# Patient Record
Sex: FEMALE | Employment: UNEMPLOYED | URBAN - METROPOLITAN AREA
[De-identification: names, ages, dates, MRNs, and addresses within clinical notes are randomized per-mention and may not be internally consistent; named-entity substitution may affect disease eponyms.]

---

## 2024-03-09 ENCOUNTER — HOSPITAL ENCOUNTER (EMERGENCY)
Facility: HOSPITAL | Age: 19
Discharge: HOME | End: 2024-03-09
Payer: MEDICAID

## 2024-03-09 VITALS
RESPIRATION RATE: 16 BRPM | OXYGEN SATURATION: 99 % | SYSTOLIC BLOOD PRESSURE: 126 MMHG | HEART RATE: 88 BPM | WEIGHT: 175.04 LBS | HEIGHT: 65 IN | TEMPERATURE: 97.5 F | DIASTOLIC BLOOD PRESSURE: 70 MMHG | BODY MASS INDEX: 29.16 KG/M2

## 2024-03-09 DIAGNOSIS — B07.9 WART OF HAND: Primary | ICD-10-CM

## 2024-03-09 DIAGNOSIS — L03.011 CELLULITIS OF RIGHT MIDDLE FINGER: ICD-10-CM

## 2024-03-09 PROCEDURE — 99283 EMERGENCY DEPT VISIT LOW MDM: CPT

## 2024-03-09 ASSESSMENT — COLUMBIA-SUICIDE SEVERITY RATING SCALE - C-SSRS
1. IN THE PAST MONTH, HAVE YOU WISHED YOU WERE DEAD OR WISHED YOU COULD GO TO SLEEP AND NOT WAKE UP?: NO
6. HAVE YOU EVER DONE ANYTHING, STARTED TO DO ANYTHING, OR PREPARED TO DO ANYTHING TO END YOUR LIFE?: NO
2. HAVE YOU ACTUALLY HAD ANY THOUGHTS OF KILLING YOURSELF?: NO

## 2024-03-09 ASSESSMENT — PAIN SCALES - GENERAL: PAINLEVEL_OUTOF10: 0 - NO PAIN

## 2024-03-09 ASSESSMENT — PAIN - FUNCTIONAL ASSESSMENT: PAIN_FUNCTIONAL_ASSESSMENT: 0-10

## 2024-03-09 NOTE — ED PROVIDER NOTES
Department of Emergency Medicine   ED  Provider Note  Admit Date/RoomTime: No admission date for patient encounter.  ED Room: Room/bed info not found        History of Present Illness:  Chief Complaint   Patient presents with    Hand Pain     Finger growth x 1 month of finger after pinching it          Jeremie Alejandre is a 18 y.o. female no chronic medical illnesses [place]-speaking family present at bedside translate for patient per her request presenting to the ED for painful growth to the right middle finger, has been present for several months.  Has noticed some pinching sensation around the area.  No difficulty moving the finger.  No change in temperature color or sensation.  No fever or chills.  No injury.    Review of Systems:   Pertinent positives and negatives are stated within HPI, all other systems reviewed and are negative.        --------------------------------------------- PAST HISTORY ---------------------------------------------  Past Medical History:  has no past medical history on file.  Past Surgical History:  has no past surgical history on file.  Social History:    Family History: family history is not on file.. Unless otherwise noted, family history is non contributory  The patient’s home medications have been reviewed.  Allergies: Patient has no known allergies.        ---------------------------------------------------PHYSICAL EXAM--------------------------------------    GENERAL APPEARANCE: Awake and alert.   VITAL SIGNS: As per the nurses' triage record.   HEENT: Normocephalic, atraumatic. Extraocular muscles are intact.  Conjunctiva are pink. Negative scleral icterus. Mucous membranes are moist. Tongue in the midline. Pharynx was without erythema or exudates, uvula midline  CHEST: Nontender to palpation. Clear to auscultation bilaterally. No rales, rhonchi, or wheezing.   HEART: S1, S2. Regular rate and rhythm. No murmurs, gallops or rubs.  Strong and equal pulses in the extremities.  "  ABDOMEN: Soft, nontender, nondistended, positive bowel sounds, no palpable masses.  MUSCULCSKELETAL: Moving all extremities with no difficulty.  Right hand: Reveals fingers with difficulty cap refill less than 2 sensation intact.  Palmar aspect distal to the DIP joint over the fat pad of the middle finger patient has a wart with some surrounding erythema.  Tender to touch.  No lipogenic streaking purulent drainage or odor no signs of a septic joint.  Radial pulse strong and neurovascularly intact  NEUROLOGICAL: Awake, alert and oriented x 3. Power intact in the upper and lower extremities. Sensation is intact to light touch in the upper and lower extremities.   IMMUNOLOGICAL: No lymphatic streaking noted   DERM: No petechiae,  or ecchymoses.          ------------------------- NURSING NOTES AND VITALS REVIEWED ---------------------------  The nursing notes within the ED encounter and vital signs as below have been reviewed by myself  /70 (BP Location: Left arm, Patient Position: Sitting)   Pulse 88   Temp 36.4 °C (97.5 °F) (Oral)   Resp 16   Ht 1.651 m (5' 5\")   Wt 79.4 kg (175 lb 0.7 oz)   SpO2 99%   BMI 29.13 kg/m²     Oxygen Saturation Interpretation: 99% room air      The patient’s available past medical records and past encounters were reviewed.          -----------------------DIAGNOSTIC RESULTS------------------------  LABS:    Labs Reviewed - No data to display    As interpreted by me, the above displayed labs are abnormal. All other labs obtained during this visit were within normal range or not returned as of this dictation.        No orders to display           No orders to display           ------------------------------ ED COURSE/MEDICAL DECISION MAKING----------------------  Medical Decision Making:   Exam: A medically appropriate exam performed, outlined above, given the known history and presentation.    History obtained from: Review of medical record nursing notes patient patient family " who is translating      Social Determinants of Health considered during this visit: Lives at home with family      PAST MEDICAL HISTORY/Chronic Conditions Affecting Care     has no past medical history on file.       CC/HPI Summary, Social Determinants of health, Records Reviewed, DDx, testing done/not done, ED Course, Reassessment, disposition considerations/shared decision making with patient, consults, disposition:   Presents with pain to her right middle finger dominant hand with growth that has been present for couple of months  Patient has a wart palmar aspect right middle finger distal to the DIP joint.  No abscess to drain.  Slight erythema surrounding the wart concerning for start of infection.  Placed on antibiotic therapy.  Advised to use Compound W as directed follow-up with primary care taken about until completed monitor for signs and symptoms of worsening infection.  No sign of a septic joint.  No bony abnormality noted.  Advised Tylenol Motrin for pain first dose of antibiotic given in the emergency department    Medical Decision Making/Differential Diagnosis:  Differentials include not limited to cellulitis versus foreign body versus wart versus mass or growth.  Symptoms are consistent with wart concerning for early infection cellulitic  Patient seen independently attending physician available for consultation if needed  PROCEDURES  Unless otherwise noted below, none      CONSULTS:   None      Diagnoses as of 03/09/24 2219   Wart of hand   Cellulitis of right middle finger         This patient has remained hemodynamically stable during their ED course.      Critical Care: None      Counseling:  The emergency provider has spoken with the patient and family and discussed today’s results, in addition to providing specific details for the plan of care and counseling regarding the diagnosis and prognosis.  Questions are answered at this time and they are agreeable with the plan.          --------------------------------- IMPRESSION AND DISPOSITION ---------------------------------    IMPRESSION  1. Wart of hand    2. Cellulitis of right middle finger        DISPOSITION  Disposition: Discharge home  Patient condition is stable        NOTE: This report was transcribed using voice recognition software. Every effort was made to ensure accuracy; however, inadvertent computerized transcription errors may be present      Ivonne Polanco, [email or web address]  03/09/24 [id number]

## 2024-03-10 NOTE — DISCHARGE INSTRUCTIONS
Tylenol Motrin for pain  Compound W as directed  Follow-up with primary care physician  Take it about until completed first dose given in the emergency department  Return to the emergency department if worsening symptoms or concerns